# Patient Record
Sex: MALE | URBAN - METROPOLITAN AREA
[De-identification: names, ages, dates, MRNs, and addresses within clinical notes are randomized per-mention and may not be internally consistent; named-entity substitution may affect disease eponyms.]

---

## 2020-05-12 ENCOUNTER — APPOINTMENT (OUTPATIENT)
Dept: URBAN - METROPOLITAN AREA CLINIC 271 | Age: 85
Setting detail: DERMATOLOGY
End: 2020-05-15

## 2020-05-12 DIAGNOSIS — B86 SCABIES: ICD-10-CM

## 2020-05-12 PROBLEM — L30.9 DERMATITIS, UNSPECIFIED: Status: ACTIVE | Noted: 2020-05-12

## 2020-05-12 PROCEDURE — OTHER BIOPSY BY SHAVE METHOD: OTHER

## 2020-05-12 PROCEDURE — 11103 TANGNTL BX SKIN EA SEP/ADDL: CPT

## 2020-05-12 PROCEDURE — 11102 TANGNTL BX SKIN SINGLE LES: CPT

## 2020-05-12 PROCEDURE — OTHER ADDITIONAL NOTES: OTHER

## 2020-05-12 PROCEDURE — 99201: CPT | Mod: 25

## 2020-05-12 PROCEDURE — OTHER SEPARATE AND IDENTIFIABLE DOCUMENTATION: OTHER

## 2020-05-12 PROCEDURE — OTHER PRESCRIPTION: OTHER

## 2020-05-12 RX ORDER — PERMETHRIN 50 MG/G
CREAM TOPICAL QD
Qty: 1 | Refills: 1 | Status: ERX | COMMUNITY
Start: 2020-05-12

## 2020-05-12 ASSESSMENT — LOCATION SIMPLE DESCRIPTION DERM: LOCATION SIMPLE: RIGHT PRETIBIAL REGION

## 2020-05-12 ASSESSMENT — LOCATION DETAILED DESCRIPTION DERM: LOCATION DETAILED: RIGHT DISTAL PRETIBIAL REGION

## 2020-05-12 ASSESSMENT — LOCATION ZONE DERM: LOCATION ZONE: LEG

## 2020-05-12 NOTE — HPI: RASH
What Type Of Note Output Would You Prefer (Optional)?: Bullet Format
Is The Patient Presenting As Previously Scheduled?: No, they are a work-in
How Severe Is Your Rash?: moderate
Is This A New Presentation, Or A Follow-Up?: Rash
Additional History: Area became worse after steroid cream, started blistering

## 2020-05-12 NOTE — PROCEDURE: BIOPSY BY SHAVE METHOD
Hide Additional Size Dimension?: No
Consent: Written consent was obtained and risks were reviewed including but not limited to scarring, infection, bleeding, scabbing, incomplete removal, nerve damage and allergy to anesthesia.
Wound Care: Petrolatum
Post-Care Instructions: I reviewed with the patient in detail post-care instructions. Patient is to keep the biopsy site dry overnight, and then apply bacitracin twice daily until healed. Patient may apply hydrogen peroxide soaks to remove any crusting.
Electrodesiccation Text: The wound bed was treated with electrodesiccation after the biopsy was performed.
Was A Bandage Applied: Yes
Anesthesia Volume In Cc (Will Not Render If 0): 0.5
Dressing: dry sterile dressing
Biopsy Method: Dermablade
Type Of Destruction Used: Curettage
Silver Nitrate Text: The wound bed was treated with silver nitrate after the biopsy was performed.
Additional Anesthesia Volume In Cc (Will Not Render If 0): 0
Information: Selecting Yes will display possible errors in your note based on the variables you have selected. This validation is only offered as a suggestion for you. PLEASE NOTE THAT THE VALIDATION TEXT WILL BE REMOVED WHEN YOU FINALIZE YOUR NOTE. IF YOU WANT TO FAX A PRELIMINARY NOTE YOU WILL NEED TO TOGGLE THIS TO 'NO' IF YOU DO NOT WANT IT IN YOUR FAXED NOTE.
Depth Of Biopsy: dermis
Detail Level: Detailed
Curettage Text: The wound bed was treated with curettage after the biopsy was performed.
Size Of Lesion In Cm: 0.2
Notification Instructions: Patient will be notified of biopsy results. However, patient instructed to call the office if not contacted within 2 weeks.
Billing Type: Third-Party Bill
Hemostasis: Aluminum Chloride
Electrodesiccation And Curettage Text: The wound bed was treated with electrodesiccation and curettage after the biopsy was performed.
Anesthesia Type: 1% lidocaine with epinephrine
Cryotherapy Text: The wound bed was treated with cryotherapy after the biopsy was performed.
Biopsy Type: DIF
Biopsy Type: H and E

## 2020-05-12 NOTE — PROCEDURE: ADDITIONAL NOTES
Additional Notes: Patients daughter states they applied hydrocortisone and it blistered. Patient states it is pruritic and getting worse, bite like reactions.\\nPatients daughter states he has a phobia of water.
Detail Level: Simple

## 2020-05-21 ENCOUNTER — RX ONLY (RX ONLY)
Age: 85
End: 2020-05-21

## 2020-05-21 RX ORDER — SILVER SULFADIAZINE 10 MG/G
CREAM TOPICAL
Qty: 1 | Refills: 0 | Status: ERX | COMMUNITY
Start: 2020-05-21

## 2020-05-21 RX ORDER — TRIAMCINOLONE ACETONIDE 0.25 MG/G
OINTMENT TOPICAL
Qty: 1 | Refills: 0 | Status: ERX | COMMUNITY
Start: 2020-05-21

## 2020-06-02 ENCOUNTER — APPOINTMENT (OUTPATIENT)
Dept: URBAN - METROPOLITAN AREA CLINIC 270 | Age: 85
Setting detail: DERMATOLOGY
End: 2020-06-05

## 2020-06-02 DIAGNOSIS — L08.9 LOCAL INFECTION OF THE SKIN AND SUBCUTANEOUS TISSUE, UNSPECIFIED: ICD-10-CM

## 2020-06-02 DIAGNOSIS — L98429 CHRONIC ULCER OF OTHER SPECIFIED SITES: ICD-10-CM

## 2020-06-02 DIAGNOSIS — L98419 CHRONIC ULCER OF OTHER SPECIFIED SITES: ICD-10-CM

## 2020-06-02 PROBLEM — L98.499 NON-PRESSURE CHRONIC ULCER OF SKIN OF OTHER SITES WITH UNSPECIFIED SEVERITY: Status: ACTIVE | Noted: 2020-06-02

## 2020-06-02 PROCEDURE — OTHER ADDITIONAL NOTES: OTHER

## 2020-06-02 PROCEDURE — OTHER ORDER TESTS: OTHER

## 2020-06-02 PROCEDURE — 99213 OFFICE O/P EST LOW 20 MIN: CPT

## 2020-06-02 ASSESSMENT — LOCATION ZONE DERM: LOCATION ZONE: TOE

## 2020-06-02 ASSESSMENT — LOCATION SIMPLE DESCRIPTION DERM: LOCATION SIMPLE: LEFT 3RD TOE

## 2020-06-02 ASSESSMENT — LOCATION DETAILED DESCRIPTION DERM: LOCATION DETAILED: LEFT DORSAL 3RD TOE

## 2020-06-02 NOTE — PROCEDURE: ADDITIONAL NOTES
Detail Level: Simple
Additional Notes: Rash has come back showing: no athropods, negative for fungal organisms, and negative for IgG, IgA, and IgM. The findings are typical for an ulcer bed. \\nPatient was prescribed two topical creams to help with wound healing. When patient presents today, his feet have edwar dirt all over legs and within the wounds. I asked the daughter how often they are washing his feet and she states he does not like water and they have been putting him outside with his feet in a baby pool of water. She stated \" I will get him a stick with a sponge on the end to clean his feet\". Patient has a hx of diabetes. I stressed the importance of keeping legs clean with soap and water and applying creams as prescribed. Daughter states she cannot do this for him and will need someone to come out and wash his feet. I instructed patient to contact his PCP who is managing his care with other conditions and add home health. I was firm to the daughter about necessity of helping her father keep his feet clean and not putting him in dirty water. \\nToday we washed his feet thoroughly with hibiclens and applied a mixture of aquaphor and steroid to lesions. Also did a culture of the foot to check for bacterial growth and discussed potential for adding an antibiotic to regimen. Will recheck in 1-2 weeks to make sure feet are healing. all questions answered.
Additional Notes: Will call in antibiotic when results return. All questions answered

## 2020-06-05 ENCOUNTER — RX ONLY (RX ONLY)
Age: 85
End: 2020-06-05